# Patient Record
Sex: FEMALE | Race: WHITE | NOT HISPANIC OR LATINO | URBAN - METROPOLITAN AREA
[De-identification: names, ages, dates, MRNs, and addresses within clinical notes are randomized per-mention and may not be internally consistent; named-entity substitution may affect disease eponyms.]

---

## 2021-04-19 ENCOUNTER — APPOINTMENT (RX ONLY)
Dept: URBAN - METROPOLITAN AREA CLINIC 282 | Facility: CLINIC | Age: 61
Setting detail: DERMATOLOGY
End: 2021-04-19

## 2021-04-19 DIAGNOSIS — M71 OTHER BURSOPATHIES: ICD-10-CM

## 2021-04-19 PROBLEM — M71.342 OTHER BURSAL CYST, LEFT HAND: Status: ACTIVE | Noted: 2021-04-19

## 2021-04-19 PROCEDURE — ? INTRALESIONAL KENALOG

## 2021-04-19 PROCEDURE — 11900 INJECT SKIN LESIONS </W 7: CPT

## 2021-04-19 PROCEDURE — ? ADDITIONAL NOTES

## 2021-04-19 PROCEDURE — ? COUNSELING

## 2021-04-19 ASSESSMENT — LOCATION SIMPLE DESCRIPTION DERM: LOCATION SIMPLE: LEFT MIDDLE FINGER

## 2021-04-19 ASSESSMENT — LOCATION ZONE DERM: LOCATION ZONE: FINGER

## 2021-04-19 ASSESSMENT — LOCATION DETAILED DESCRIPTION DERM: LOCATION DETAILED: LEFT DISTAL DORSAL MIDDLE FINGER

## 2021-04-19 NOTE — PROCEDURE: INTRALESIONAL KENALOG
Concentration Of Solution Injected (Mg/Ml): 5.0
Consent: The risks of atrophy were reviewed with the patient.
Total Volume Injected (Ccs- Only Use Numbers And Decimals): 0.2
X Size Of Lesion In Cm (Optional): 0
Kenalog Preparation: Kenalog with 1% lidocaine with epinephrine
Detail Level: Simple
Include Z78.9 (Other Specified Conditions Influencing Health Status) As An Associated Diagnosis?: No
Medical Necessity Clause: This procedure was medically necessary because the lesions that were treated were:

## 2021-04-19 NOTE — HPI: SKIN LESION
How Severe Is Your Skin Lesion?: moderate
Has Your Skin Lesion Been Treated?: not been treated
Is This A New Presentation, Or A Follow-Up?: Skin Lesion
Additional History: The lesion is causing a change in the fingernail growth with a scoop shape.